# Patient Record
Sex: MALE | Race: WHITE | Employment: FULL TIME | URBAN - METROPOLITAN AREA
[De-identification: names, ages, dates, MRNs, and addresses within clinical notes are randomized per-mention and may not be internally consistent; named-entity substitution may affect disease eponyms.]

---

## 2023-03-02 ENCOUNTER — ANESTHESIA EVENT (OUTPATIENT)
Dept: ENDOSCOPY | Age: 48
End: 2023-03-02
Payer: COMMERCIAL

## 2023-03-02 RX ORDER — OXYBUTYNIN CHLORIDE 5 MG/1
5 TABLET ORAL 2 TIMES DAILY
COMMUNITY

## 2023-03-03 ENCOUNTER — HOSPITAL ENCOUNTER (OUTPATIENT)
Age: 48
Setting detail: OUTPATIENT SURGERY
Discharge: HOME | End: 2023-03-03
Attending: INTERNAL MEDICINE | Admitting: INTERNAL MEDICINE
Payer: COMMERCIAL

## 2023-03-03 ENCOUNTER — ANESTHESIA (OUTPATIENT)
Dept: ENDOSCOPY | Age: 48
End: 2023-03-03
Payer: COMMERCIAL

## 2023-03-03 VITALS
WEIGHT: 168 LBS | RESPIRATION RATE: 16 BRPM | SYSTOLIC BLOOD PRESSURE: 101 MMHG | OXYGEN SATURATION: 98 % | HEART RATE: 58 BPM | DIASTOLIC BLOOD PRESSURE: 75 MMHG | TEMPERATURE: 97 F

## 2023-03-03 DIAGNOSIS — K21.9 LPRD (LARYNGOPHARYNGEAL REFLUX DISEASE): ICD-10-CM

## 2023-03-03 PROCEDURE — 2580000003 HC RX 258: Performed by: INTERNAL MEDICINE

## 2023-03-03 PROCEDURE — 3609012700 HC EGD DILATION SAVORY: Performed by: INTERNAL MEDICINE

## 2023-03-03 PROCEDURE — 88305 TISSUE EXAM BY PATHOLOGIST: CPT

## 2023-03-03 PROCEDURE — C1769 GUIDE WIRE: HCPCS | Performed by: INTERNAL MEDICINE

## 2023-03-03 PROCEDURE — 3700000000 HC ANESTHESIA ATTENDED CARE: Performed by: INTERNAL MEDICINE

## 2023-03-03 PROCEDURE — 3609012400 HC EGD TRANSORAL BIOPSY SINGLE/MULTIPLE: Performed by: INTERNAL MEDICINE

## 2023-03-03 PROCEDURE — 7100000010 HC PHASE II RECOVERY - FIRST 15 MIN: Performed by: INTERNAL MEDICINE

## 2023-03-03 PROCEDURE — 7100000011 HC PHASE II RECOVERY - ADDTL 15 MIN: Performed by: INTERNAL MEDICINE

## 2023-03-03 PROCEDURE — 6360000002 HC RX W HCPCS: Performed by: NURSE ANESTHETIST, CERTIFIED REGISTERED

## 2023-03-03 PROCEDURE — 2709999900 HC NON-CHARGEABLE SUPPLY: Performed by: INTERNAL MEDICINE

## 2023-03-03 PROCEDURE — 3700000001 HC ADD 15 MINUTES (ANESTHESIA): Performed by: INTERNAL MEDICINE

## 2023-03-03 PROCEDURE — 2580000003 HC RX 258: Performed by: NURSE ANESTHETIST, CERTIFIED REGISTERED

## 2023-03-03 RX ORDER — LIDOCAINE HYDROCHLORIDE 10 MG/ML
1 INJECTION, SOLUTION EPIDURAL; INFILTRATION; INTRACAUDAL; PERINEURAL
Status: DISCONTINUED | OUTPATIENT
Start: 2023-03-03 | End: 2023-03-03 | Stop reason: HOSPADM

## 2023-03-03 RX ORDER — SODIUM CHLORIDE 9 MG/ML
INJECTION, SOLUTION INTRAVENOUS PRN
Status: CANCELLED | OUTPATIENT
Start: 2023-03-03

## 2023-03-03 RX ORDER — SODIUM CHLORIDE 9 MG/ML
INJECTION, SOLUTION INTRAVENOUS PRN
Status: DISCONTINUED | OUTPATIENT
Start: 2023-03-03 | End: 2023-03-03 | Stop reason: HOSPADM

## 2023-03-03 RX ORDER — LIDOCAINE HYDROCHLORIDE 10 MG/ML
0.1 INJECTION, SOLUTION EPIDURAL; INFILTRATION; INTRACAUDAL; PERINEURAL
Status: DISCONTINUED | OUTPATIENT
Start: 2023-03-03 | End: 2023-03-03 | Stop reason: HOSPADM

## 2023-03-03 RX ORDER — SODIUM CHLORIDE 0.9 % (FLUSH) 0.9 %
5-40 SYRINGE (ML) INJECTION PRN
Status: DISCONTINUED | OUTPATIENT
Start: 2023-03-03 | End: 2023-03-03 | Stop reason: HOSPADM

## 2023-03-03 RX ORDER — PROPOFOL 10 MG/ML
INJECTION, EMULSION INTRAVENOUS PRN
Status: DISCONTINUED | OUTPATIENT
Start: 2023-03-03 | End: 2023-03-03 | Stop reason: SDUPTHER

## 2023-03-03 RX ORDER — SODIUM CHLORIDE 0.9 % (FLUSH) 0.9 %
5-40 SYRINGE (ML) INJECTION EVERY 12 HOURS SCHEDULED
Status: DISCONTINUED | OUTPATIENT
Start: 2023-03-03 | End: 2023-03-03 | Stop reason: HOSPADM

## 2023-03-03 RX ORDER — SODIUM CHLORIDE, SODIUM LACTATE, POTASSIUM CHLORIDE, CALCIUM CHLORIDE 600; 310; 30; 20 MG/100ML; MG/100ML; MG/100ML; MG/100ML
INJECTION, SOLUTION INTRAVENOUS ONCE
Status: COMPLETED | OUTPATIENT
Start: 2023-03-03 | End: 2023-03-03

## 2023-03-03 RX ORDER — SODIUM CHLORIDE 0.9 % (FLUSH) 0.9 %
5-40 SYRINGE (ML) INJECTION EVERY 12 HOURS SCHEDULED
Status: CANCELLED | OUTPATIENT
Start: 2023-03-03

## 2023-03-03 RX ORDER — SODIUM CHLORIDE 0.9 % (FLUSH) 0.9 %
5-40 SYRINGE (ML) INJECTION PRN
Status: CANCELLED | OUTPATIENT
Start: 2023-03-03

## 2023-03-03 RX ORDER — SODIUM CHLORIDE, SODIUM LACTATE, POTASSIUM CHLORIDE, CALCIUM CHLORIDE 600; 310; 30; 20 MG/100ML; MG/100ML; MG/100ML; MG/100ML
INJECTION, SOLUTION INTRAVENOUS CONTINUOUS PRN
Status: DISCONTINUED | OUTPATIENT
Start: 2023-03-03 | End: 2023-03-03 | Stop reason: SDUPTHER

## 2023-03-03 RX ORDER — ONDANSETRON 2 MG/ML
4 INJECTION INTRAMUSCULAR; INTRAVENOUS
Status: CANCELLED | OUTPATIENT
Start: 2023-03-03 | End: 2023-03-04

## 2023-03-03 RX ADMIN — PROPOFOL 20 MG: 10 INJECTION, EMULSION INTRAVENOUS at 10:52

## 2023-03-03 RX ADMIN — PROPOFOL 20 MG: 10 INJECTION, EMULSION INTRAVENOUS at 10:56

## 2023-03-03 RX ADMIN — PROPOFOL 20 MG: 10 INJECTION, EMULSION INTRAVENOUS at 10:54

## 2023-03-03 RX ADMIN — PROPOFOL 130 MG: 10 INJECTION, EMULSION INTRAVENOUS at 10:50

## 2023-03-03 RX ADMIN — SODIUM CHLORIDE, SODIUM LACTATE, POTASSIUM CHLORIDE, AND CALCIUM CHLORIDE: .6; .31; .03; .02 INJECTION, SOLUTION INTRAVENOUS at 10:30

## 2023-03-03 RX ADMIN — SODIUM CHLORIDE, POTASSIUM CHLORIDE, SODIUM LACTATE AND CALCIUM CHLORIDE: 600; 310; 30; 20 INJECTION, SOLUTION INTRAVENOUS at 10:37

## 2023-03-03 ASSESSMENT — PAIN - FUNCTIONAL ASSESSMENT: PAIN_FUNCTIONAL_ASSESSMENT: 0-10

## 2023-03-03 NOTE — H&P
[facility]   Pre-operative History and Physical    Patient: Ramya Malik  : 1975  Acct#:     HISTORY OF PRESENT ILLNESS:    The patient is a 51 y.o. male who presents for history of oropharyngeal dysphagia hoarseness and LPR    Indications: LPR, oropharyngeal dysphagia    Past Medical History:        Diagnosis Date    Asthma     Cancer ([facility] [address]     kidney      Past Surgical History:        Procedure Laterality Date    KIDNEY SURGERY      cancer      Medications Prior to Admission:   No current facility-administered medications on file prior to encounter. Current Outpatient Medications on File Prior to Encounter   Medication Sig Dispense Refill    oxybutynin (DITROPAN) 5 MG tablet Take 5 mg by mouth in the morning and at bedtime      Multiple Vitamin (MULTIVITAMIN PO) Take by mouth daily      Beclomethasone Dipropionate (QVAR IN) Inhale into the lungs daily          Allergies:  Patient has no known allergies. Social History:   Social History     Socioeconomic History    Marital status:      Spouse name: Not on file    Number of children: Not on file    Years of education: Not on file    Highest education level: Not on file   Occupational History    Not on file   Tobacco Use    Smoking status: Never    Smokeless tobacco: Not on file   Substance and Sexual Activity    Alcohol use: Not on file    Drug use: Not on file    Sexual activity: Not on file   Other Topics Concern    Not on file   Social History Narrative    Not on file     Social Determinants of Health     Financial Resource Strain: Not on file   Food Insecurity: Not on file   Transportation Needs: Not on file   Physical Activity: Not on file   Stress: Not on file   Social Connections: Not on file   Intimate Partner Violence: Not on file   Housing Stability: Not on file      Family History:   No family history on file.      PHYSICAL EXAM:      /67   Pulse 56   Temp 97 °F (36.1 °C) (Temporal)   Resp 16   Wt 168 lb (76.2 kg)   SpO2 100%  I        Heart:  Normal apical impulse, regular rate and rhythm, normal S1 and S2, no S3 or S4, and no murmur noted    Lungs:  No increased work of breathing, good air exchange, clear to auscultation bilaterally, no crackles or wheezing    Abdomen:  No scars, normal bowel sounds, soft, non-distended, non-tender, no masses palpated, no hepatosplenomegally      ASA Class  ASA 2 - Patient with mild systemic disease with no functional limitations    Mallampati Class: 2      ASSESSMENT AND PLAN:    1. Patient is a suitable candidate for endoscopic procedure and attendant anesthesia  2. Risks, benefits, alternatives of procedure discussed in detail with patient including risks of bleeding, infection, perforation, risks of sedation, risks of missed lesions. The patient wishes to proceed.

## 2023-03-03 NOTE — ANESTHESIA POSTPROCEDURE EVALUATION
Department of Anesthesiology  Postprocedure Note    Patient: William Cano  MRN: [id number]  YOB: 1975  Date of evaluation: 3/3/2023      Procedure Summary     Date: 03/03/23 Room / Location: [facility] [address] [place] [address] / [facility]    Anesthesia Start: 6250 Anesthesia Stop: 1103    Procedures:       EGD BIOPSY      EGD DILATION SAVORY Diagnosis:       LPRD (laryngopharyngeal reflux disease)      (LPRD (laryngopharyngeal reflux disease) [K21.9])    Surgeons: Thierno Kenny MD Responsible Provider: Fariha Woodruff MD    Anesthesia Type: general, MAC ASA Status: 2          Anesthesia Type: No value filed.     Farooq Phase I: Farooq Score: 10    Farooq Phase II: Farooq Score: 9      Anesthesia Post Evaluation    Patient location during evaluation: PACU  Patient participation: complete - patient participated  Level of consciousness: awake and alert  Pain score: 0  Airway patency: patent  Nausea & Vomiting: no nausea and no vomiting  Complications: no  Cardiovascular status: blood pressure returned to baseline  Respiratory status: acceptable  Hydration status: stable

## 2023-03-03 NOTE — PROCEDURES
Endoscopy Note    Patient: Eloy Yen  : 1975      Procedure: Esophagogastroduodenoscopy with biopsies and savory dilation    Date:  3/3/2023     Surgeon:  Elizabeth Weller MD     Referring Physician:  Kennedi Domínguez      History:      INDICATION: Oropharyngeal dysphagia LPR     ASA: 2  SEDATION: MAC         Operative Surgeon: Elizabeth Weller MD  Scope Type: Gastroscope      Preoperative Diagnosis: Oropharyngeal dysphagia, LPR, family history of celiac disease  Postoperative Diagnosis: Duodenitis gastritis    Procedure Performed: EGD    Procedure Details:    With the patient in left lateral position the endoscope was passed through the hypopharynx into the esophagus. The scope as then passed through the esophagus to the second portion of duodenum. All visualized portions were carefully inspected. The gastric air was suctioned and the scope as removed. Patient tolerated the procedure well. Findings and maneuvers are discussed below. Complications:  None  Estimated Blood Loss: minimal to none    Post Operative Findings:   Esophagus: Endoscopically normal no signs of eosinophilic esophagitis appreciated. Biopsies of the distal midesophagus were taken to further assess. GE junction was normal there was no signs of stricture Ceja's or esophagitis. At the end of the procedure a [address] savory dilation was performed with mild resistance    Stomach: Mild antritis with erosions no deep ulcerations seen biopsies were taken to rule out H. pylori retroflexion did not demonstrate hiatal hernia  Duodenum: Minimal duodenitis in the bulb otherwise normal biopsies were taken to rule out celiac disease    Plan: Follow-up biopsies. No obvious endoscopic findings for severe gastroesophageal reflux disease. He can do a trial of PPI to see if his symptoms improve however.         Signed By: MD Elizabeth Delgado MD,   [facility]  [phone]    Please note that some or all of this record was generated using voice recognition software. If there are any questions about the content of this document, please contact the author as some errors in translation may have occurred.

## 2023-03-03 NOTE — DISCHARGE INSTRUCTIONS
PATIENT INSTRUCTIONS  POST-SEDATION    [facility]          IMMEDIATELY FOLLOWING PROCEDURE:    Do not drive or operate machinery for the first twenty four hours after surgery. Do not make any important decisions for twenty four hours after surgery or while taking narcotic pain medications or sedatives. You should NOT BE LEFT UNATTENDED OR ALONE. A responsible adult should be with you for the rest of the day of your procedure and also during the night for your protection and safety. You may experience some light headedness. Rest at home with activity as tolerated. You may not need to go to bed, but it is important to rest for the next 24 hours. You should not engage in athletic sports such as basketball, volleyball, jogging, skating, or activities requiring refined motor skills for 24 hours. If you develop intractable nausea and vomiting or a severe headache please notify your doctor immediately. You are not expected to have any fever, but if you feel warm, take your temperature. If you have a fever 101 degrees or higher, call your doctor. If you have had an Endoscopy:   *Eat lightly for your first meal and gradually resume your normal / prescribed diet. DO NOT eat or drink until your gag reflex returns. *If you have a sore throat you may use lozenges, or salt water gargles. ONCE YOU ARE HOME, IF YOU SHOULD HAVE:  Difficulty in breathing, persistent nausea or vomiting, bleeding you feel is excessive, or pain that is unusual, increased abdominal bloating, or any swelling, fever / chills, call your physician. If you cannot contact your physician, but feel that your signs and symptoms need a physician's attention, go to the Emergency Department. FOLLOW-UP:    Please follow up with your PCP as scheduled or needed. Dr. Laura Frank MD will call you with the biopsy findings. Call Dr. Laura Frank MD if there are any GI concerns.  [phone]      You may be receiving a follow up phone call to ask about your care. Upper GI Endoscopy: What to Expect at [address] [place] [address]  After you have an endoscopy, you will stay at the hospital or clinic for 1 to 2 hours. This will allow the medicine to wear off. You will be able to go home after your doctor or nurse checks to make sure you are not having any problems. You may have to stay overnight if you had treatment during the test. You may have a sore throat for a day or two after the test.  This care sheet gives you a general idea about what to expect after the test.  How can you care for yourself at home? Activity  Rest as much as you need to after you go home. You should be able to go back to your usual activities the day after the test.  Diet  Follow your doctor's directions for eating after the test.  Drink plenty of fluids (unless your doctor has told you not to). Medications  If you have a sore throat the day after the test, use an over-the-counter spray to numb your throat. Follow-up care is a key part of your treatment and safety. Be sure to make and go to all appointments, and call your doctor if you are having problems. It's also a good idea to know your test results and keep a list of the medicines you take. When should you call for help? Call 911 anytime you think you may need emergency care. For example, call if:    You passed out (loses consciousness). You have trouble breathing. You pass maroon or bloody stools. Call your doctor now or seek immediate medical care if:    You have pain that does not get better after your take pain medicine. You have new or worse belly pain. You have blood in your stools. You are sick to your stomach and cannot keep fluids down. You have a fever. You cannot pass stools or gas. Watch closely for changes in your health, and be sure to contact your doctor if:    Your throat still hurts after a day or two. You do not get better as expected.    Where can you learn more? Go to [email or web address] and sign in to your [employer] account. Enter J627 in the Get Smart Content box to learn more about \"Upper GI Endoscopy: What to Expect at Home. \"     If you do not have an account, please click on the \"Sign Up Now\" link. Current as of: May 12, 2017  Content Version: 11.6  © 6486-7470 [employer]. Care instructions adapted under license by [facility] ([facility]). If you have questions about a medical condition or this instruction, always ask your healthcare professional. [address] any warranty or liability for your use of this information.

## 2023-03-03 NOTE — ANESTHESIA PRE PROCEDURE
Department of Anesthesiology  Preprocedure Note       Name:  Jossue Gibbons   Age:  51 y.o.  :  1975                                          MRN:  [id number]         Date:  3/3/2023      Surgeon: Emerald Sharif):  Victor Manuel Kimbrough MD    Procedure: Procedure(s):  ESOPHAGOGASTRODUODENOSCOPY    Medications prior to admission:   Prior to Admission medications    Medication Sig Start Date End Date Taking? Authorizing Provider   oxybutynin (DITROPAN) 5 MG tablet Take 5 mg by mouth in the morning and at bedtime   Yes Historical Provider, MD   Multiple Vitamin (MULTIVITAMIN PO) Take by mouth daily   Yes Historical Provider, MD   Beclomethasone Dipropionate (QVAR IN) Inhale into the lungs daily   Yes Historical Provider, MD       Current medications:    Current Facility-Administered Medications   Medication Dose Route Frequency Provider Last Rate Last Admin    lidocaine PF 1 % injection 1 mL  1 mL IntraDERmal Once PRN Ady Martinez MD        sodium chloride flush 0.9 % injection 5-40 mL  5-40 mL IntraVENous 2 times per day Ady Martinez MD        sodium chloride flush 0.9 % injection 5-40 mL  5-40 mL IntraVENous PRN Ady Martinez MD        0.9 % sodium chloride infusion   IntraVENous PRN Ady Martinez MD        lactated ringers IV soln infusion   IntraVENous Once Victor Manuel Kimbrough MD        lidocaine PF 1 % injection 0.1 mL  0.1 mL IntraDERmal Once PRN Victor Manuel Kimbrough MD           Allergies:  No Known Allergies    Problem List:  There is no problem list on file for this patient.       Past Medical History:        Diagnosis Date    Asthma     Cancer ([facility] [address]     kidney       Past Surgical History:        Procedure Laterality Date    KIDNEY SURGERY      cancer       Social History:    Social History     Tobacco Use    Smoking status: Never    Smokeless tobacco: Not on file   Substance Use Topics    Alcohol use: Not on file                                Counseling given: Not Answered      Vital Signs (Current):   Vitals: 03/02/23 1524 03/03/23 1027   BP:  121/67   Pulse:  56   Resp:  16   Temp:  97 °F (36.1 °C)   TempSrc:  Temporal   SpO2:  100%   Weight: 168 lb (76.2 kg)                                               BP Readings from Last 3 Encounters:   03/03/23 121/67       NPO Status:                                                                                 BMI:   Wt Readings from Last 3 Encounters:   03/02/23 168 lb (76.2 kg)     There is no height or weight on file to calculate BMI.    CBC: No results found for: WBC, RBC, HGB, HCT, MCV, RDW, PLT    CMP: No results found for: NA, K, CL, CO2, BUN, CREATININE, GFRAA, AGRATIO, LABGLOM, GLUCOSE, GLU, PROT, CALCIUM, BILITOT, ALKPHOS, AST, ALT    POC Tests: No results for input(s): POCGLU, POCNA, POCK, POCCL, POCBUN, POCHEMO, POCHCT in the last 72 hours. Coags: No results found for: PROTIME, INR, APTT    HCG (If Applicable): No results found for: PREGTESTUR, PREGSERUM, HCG, HCGQUANT     ABGs: No results found for: PHART, PO2ART, ZYF5LQJ, QEE3YEW, BEART, O1PALHMB     Type & Screen (If Applicable):  No results found for: LABABO, LABRH    Drug/Infectious Status (If Applicable):  No results found for: HIV, HEPCAB    COVID-19 Screening (If Applicable): No results found for: COVID19        Anesthesia Evaluation    Airway: Mallampati: II  TM distance: >3 FB   Neck ROM: full  Mouth opening: > = 3 FB   Dental: normal exam         Pulmonary:normal exam  breath sounds clear to auscultation                            ROS comment: Breathing fine today   Cardiovascular:  Exercise tolerance: good (>4 METS),           Rhythm: regular  Rate: normal                 ROS comment: Hx SVT s/p ablation x2 . Currently asymp     Neuro/Psych:   Negative Neuro/Psych ROS              GI/Hepatic/Renal:        (-) GERD      ROS comment: Hx renal ca s/p partial nephrectomy. Endo/Other:                      ROS comment: TMJ Abdominal:             Vascular:           Other Findings:           Anesthesia Plan      general and MAC     ASA 2     (Pt agrees to risks. Benefits and options of MAC/GETA. Questions answered. Willing to proceed.)  Induction: intravenous. Anesthetic plan and risks discussed with patient.                         Sahil Abbasi MD   3/3/2023

## 2023-04-20 ENCOUNTER — ANESTHESIA EVENT (OUTPATIENT)
Dept: ENDOSCOPY | Age: 48
End: 2023-04-20
Payer: COMMERCIAL

## 2023-04-21 ENCOUNTER — ANESTHESIA (OUTPATIENT)
Dept: ENDOSCOPY | Age: 48
End: 2023-04-21
Payer: COMMERCIAL

## 2023-04-21 ENCOUNTER — HOSPITAL ENCOUNTER (OUTPATIENT)
Age: 48
Setting detail: OUTPATIENT SURGERY
Discharge: HOME | End: 2023-04-21
Attending: INTERNAL MEDICINE | Admitting: INTERNAL MEDICINE
Payer: COMMERCIAL

## 2023-04-21 VITALS
TEMPERATURE: 96.8 F | HEART RATE: 66 BPM | BODY MASS INDEX: 21.67 KG/M2 | WEIGHT: 160 LBS | DIASTOLIC BLOOD PRESSURE: 67 MMHG | RESPIRATION RATE: 14 BRPM | HEIGHT: 72 IN | SYSTOLIC BLOOD PRESSURE: 111 MMHG | OXYGEN SATURATION: 97 %

## 2023-04-21 PROCEDURE — 3609027000 HC COLONOSCOPY: Performed by: INTERNAL MEDICINE

## 2023-04-21 PROCEDURE — 7100000011 HC PHASE II RECOVERY - ADDTL 15 MIN: Performed by: INTERNAL MEDICINE

## 2023-04-21 PROCEDURE — 2709999900 HC NON-CHARGEABLE SUPPLY: Performed by: INTERNAL MEDICINE

## 2023-04-21 PROCEDURE — 6360000002 HC RX W HCPCS

## 2023-04-21 PROCEDURE — 7100000010 HC PHASE II RECOVERY - FIRST 15 MIN: Performed by: INTERNAL MEDICINE

## 2023-04-21 PROCEDURE — 3700000001 HC ADD 15 MINUTES (ANESTHESIA): Performed by: INTERNAL MEDICINE

## 2023-04-21 PROCEDURE — 2500000003 HC RX 250 WO HCPCS

## 2023-04-21 PROCEDURE — 3700000000 HC ANESTHESIA ATTENDED CARE: Performed by: INTERNAL MEDICINE

## 2023-04-21 PROCEDURE — 2580000003 HC RX 258: Performed by: ANESTHESIOLOGY

## 2023-04-21 RX ORDER — SODIUM CHLORIDE, SODIUM LACTATE, POTASSIUM CHLORIDE, CALCIUM CHLORIDE 600; 310; 30; 20 MG/100ML; MG/100ML; MG/100ML; MG/100ML
INJECTION, SOLUTION INTRAVENOUS CONTINUOUS
Status: DISCONTINUED | OUTPATIENT
Start: 2023-04-21 | End: 2023-04-21 | Stop reason: HOSPADM

## 2023-04-21 RX ORDER — SODIUM CHLORIDE 9 MG/ML
INJECTION, SOLUTION INTRAVENOUS PRN
Status: DISCONTINUED | OUTPATIENT
Start: 2023-04-21 | End: 2023-04-21 | Stop reason: HOSPADM

## 2023-04-21 RX ORDER — LIDOCAINE HYDROCHLORIDE 20 MG/ML
INJECTION, SOLUTION EPIDURAL; INFILTRATION; INTRACAUDAL; PERINEURAL PRN
Status: DISCONTINUED | OUTPATIENT
Start: 2023-04-21 | End: 2023-04-21 | Stop reason: SDUPTHER

## 2023-04-21 RX ORDER — LIDOCAINE HYDROCHLORIDE 10 MG/ML
1 INJECTION, SOLUTION EPIDURAL; INFILTRATION; INTRACAUDAL; PERINEURAL
Status: DISCONTINUED | OUTPATIENT
Start: 2023-04-21 | End: 2023-04-21 | Stop reason: HOSPADM

## 2023-04-21 RX ORDER — SODIUM CHLORIDE 0.9 % (FLUSH) 0.9 %
5-40 SYRINGE (ML) INJECTION EVERY 12 HOURS SCHEDULED
Status: DISCONTINUED | OUTPATIENT
Start: 2023-04-21 | End: 2023-04-21 | Stop reason: HOSPADM

## 2023-04-21 RX ORDER — PROPOFOL 10 MG/ML
INJECTION, EMULSION INTRAVENOUS PRN
Status: DISCONTINUED | OUTPATIENT
Start: 2023-04-21 | End: 2023-04-21 | Stop reason: SDUPTHER

## 2023-04-21 RX ORDER — SODIUM CHLORIDE 0.9 % (FLUSH) 0.9 %
5-40 SYRINGE (ML) INJECTION PRN
Status: DISCONTINUED | OUTPATIENT
Start: 2023-04-21 | End: 2023-04-21 | Stop reason: HOSPADM

## 2023-04-21 RX ADMIN — PROPOFOL 80 MG: 10 INJECTION, EMULSION INTRAVENOUS at 07:34

## 2023-04-21 RX ADMIN — PROPOFOL 60 MG: 10 INJECTION, EMULSION INTRAVENOUS at 07:36

## 2023-04-21 RX ADMIN — PROPOFOL 50 MG: 10 INJECTION, EMULSION INTRAVENOUS at 07:38

## 2023-04-21 RX ADMIN — SODIUM CHLORIDE, POTASSIUM CHLORIDE, SODIUM LACTATE AND CALCIUM CHLORIDE: 600; 310; 30; 20 INJECTION, SOLUTION INTRAVENOUS at 07:09

## 2023-04-21 RX ADMIN — LIDOCAINE HYDROCHLORIDE 50 MG: 20 INJECTION, SOLUTION EPIDURAL; INFILTRATION; INTRACAUDAL; PERINEURAL at 07:35

## 2023-04-21 ASSESSMENT — PAIN - FUNCTIONAL ASSESSMENT: PAIN_FUNCTIONAL_ASSESSMENT: 0-10

## 2023-04-21 NOTE — ANESTHESIA PRE PROCEDURE
Department of Anesthesiology  Preprocedure Note       Name:  Vijay Wharton   Age:  51 y.o.  :  1975                                          MRN:  [id number]         Date:  2023      Surgeon: Duncan Villeda):  Mamadou Romo MD    Procedure: Procedure(s):  COLONOSCOPY    Medications prior to admission:   Prior to Admission medications    Medication Sig Start Date End Date Taking? Authorizing Provider   oxybutynin (DITROPAN) 5 MG tablet Take 5 mg by mouth in the morning and at bedtime    Historical Provider, MD   Multiple Vitamin (MULTIVITAMIN PO) Take by mouth daily    Historical Provider, MD   Beclomethasone Dipropionate (QVAR IN) Inhale into the lungs daily    Historical Provider, MD       Current medications:    No current facility-administered medications for this encounter. Current Outpatient Medications   Medication Sig Dispense Refill    oxybutynin (DITROPAN) 5 MG tablet Take 5 mg by mouth in the morning and at bedtime      Multiple Vitamin (MULTIVITAMIN PO) Take by mouth daily      Beclomethasone Dipropionate (QVAR IN) Inhale into the lungs daily         Allergies:  No Known Allergies    Problem List:  There is no problem list on file for this patient.       Past Medical History:        Diagnosis Date    Asthma     Cancer [facility])     kidney       Past Surgical History:        Procedure Laterality Date    KIDNEY SURGERY      cancer    UPPER GASTROINTESTINAL ENDOSCOPY N/A 3/3/2023    EGD BIOPSY performed by Mamadou Romo MD at [address] [facility]  3/3/2023    EGD DILATION SAVORY performed by Mamadou Romo MD at [address] [place] [address] History:    Social History     Tobacco Use    Smoking status: Never    Smokeless tobacco: Not on file   Substance Use Topics    Alcohol use: Not on file                                Counseling given: Not Answered      Vital Signs (Current):   Vitals:    23 1350   Weight: 165 lb (74.8 kg)   Height: 5' 11.5\"

## 2023-04-21 NOTE — ANESTHESIA POSTPROCEDURE EVALUATION
Department of Anesthesiology  Postprocedure Note    Patient: Tim Daugherty  MRN: [id number]  YOB: 1975  Date of evaluation: 4/21/2023      Procedure Summary     Date: 04/21/23 Room / Location: [address]    Anesthesia Start: 0730 Anesthesia Stop: 8347    Procedure: COLONOSCOPY Diagnosis:       Colon cancer screening      (COLON CANCER SCREENING)    Surgeons: Leora Linares MD Responsible Provider: Niranjan Ulloa DO    Anesthesia Type: general ASA Status: 2          Anesthesia Type: No value filed.     Farooq Phase I: Farooq Score: 10    Farooq Phase II: Farooq Score: 9      Anesthesia Post Evaluation    Patient location during evaluation: PACU  Patient participation: complete - patient participated  Level of consciousness: awake and alert  Pain score: 0  Airway patency: patent  Nausea & Vomiting: no nausea and no vomiting  Complications: no  Cardiovascular status: blood pressure returned to baseline and hemodynamically stable  Respiratory status: acceptable and room air  Hydration status: euvolemic

## 2023-04-21 NOTE — PROCEDURES
software. If there are any questions about the content of this document, please contact the author as some errors in translation may have occurred.

## 2023-04-21 NOTE — DISCHARGE INSTRUCTIONS
PATIENT INSTRUCTIONS  POST-SEDATION    [facility]          IMMEDIATELY FOLLOWING PROCEDURE:    Do not drive or operate machinery for the first twenty four hours after surgery. Do not make any important decisions for twenty four hours after surgery or while taking narcotic pain medications or sedatives. You should NOT BE LEFT UNATTENDED OR ALONE. A responsible adult should be with you for the rest of the day of your procedure and also during the night for your protection and safety. You may experience some light headedness. Rest at home with activity as tolerated. You may not need to go to bed, but it is important to rest for the next 24 hours. You should not engage in athletic sports such as basketball, volleyball, jogging, skating, or activities requiring refined motor skills for 24 hours. If you develop intractable nausea and vomiting or a severe headache please notify your doctor immediately. You are not expected to have any fever, but if you feel warm, take your temperature. If you have a fever 101 degrees or higher, call your doctor. If you have had an Endoscopy:   *Eat lightly for your first meal and gradually resume your normal / prescribed diet. *If you have had a colonoscopy, do not expect a normal bowel movement for approximately three days due to the cleansing of the large intestine prior to colonoscopy. ONCE YOU ARE HOME, IF YOU SHOULD HAVE:  Difficulty in breathing, persistent nausea or vomiting, bleeding you feel is excessive, or pain that is unusual, increased abdominal bloating, or any swelling, fever / chills, call your physician. If you cannot contact your physician, but feel that your signs and symptoms need a physician's attention, go to the Emergency Department. FOLLOW-UP:    Please follow up with @PCP@ as scheduled or needed. Call Dr. Arely Ty MD if there are any GI concerns. [phone]    Repeat Colonoscopy in 10 years.     You may be receiving a

## 2023-04-21 NOTE — H&P
[facility]   Pre[facility]operative History and Physical    Patient: Earnest Rivera  : 1975  Acct#:     HISTORY OF PRESENT ILLNESS:    The patient is a 51 y.o. male who presents for colon cancer screening    Indications: Colon cancer screening    Past Medical History:        Diagnosis Date    Asthma     Cancer ([address]     kidney      Past Surgical History:        Procedure Laterality Date    KIDNEY SURGERY      cancer    UPPER GASTROINTESTINAL ENDOSCOPY N/A 3/3/2023    EGD BIOPSY performed by Kimmy Pace MD at [address] [facility]  3/3/2023    EGD DILATION SAVORY performed by Kimmy Pace MD at [address] [place] [address]      Medications Prior to Admission:   No current facility-administered medications on file prior to encounter. Current Outpatient Medications on File Prior to Encounter   Medication Sig Dispense Refill    oxybutynin (DITROPAN) 5 MG tablet Take 1 tablet by mouth in the morning and at bedtime      Multiple Vitamin (MULTIVITAMIN PO) Take by mouth daily      Beclomethasone Dipropionate (QVAR IN) Inhale into the lungs daily          Allergies:  Patient has no known allergies.     Social History:   Social History     Socioeconomic History    Marital status:      Spouse name: Not on file    Number of children: Not on file    Years of education: Not on file    Highest education level: Not on file   Occupational History    Not on file   Tobacco Use    Smoking status: Never    Smokeless tobacco: Never   Vaping Use    Vaping Use: Never used   Substance and Sexual Activity    Alcohol use: Not on file    Drug use: Not on file    Sexual activity: Not on file   Other Topics Concern    Not on file   Social History Narrative    Not on file     Social Determinants of Health     Financial Resource Strain: Not on file   Food Insecurity: Not on file   Transportation Needs: Not on file   Physical Activity: Not on file   Stress: Not on file   Social Connections: Not on

## (undated) DEVICE — ENDOSCOPIC KIT 2 12 FT OP4 DE2 GWN SYR

## (undated) DEVICE — ELECTRODE ECG MONITR FOAM TEAR DROP ADLT RED

## (undated) DEVICE — FORCEPS BX L240CM DIA2.4MM L NDL RAD JAW 4 133340

## (undated) DEVICE — CONMED SCOPE SAVER BITE BLOCK, 20X27 MM: Brand: SCOPE SAVER

## (undated) DEVICE — CANNULA NSL L4M O2 AD FILTERLINE

## (undated) DEVICE — GUIDEWIRE WITH SPRING TIP - SINGLE USE: Brand: SAFEGUIDE®

## (undated) DEVICE — AIRLIFE™ NASAL OXYGEN CANNULA CURVED, FLARED TIP, WITH 7 FEET (2.1 M) CRUSH RESISTANT TUBING, OVER-THE-EAR STYLE: Brand: AIRLIFE™